# Patient Record
Sex: MALE | NOT HISPANIC OR LATINO | URBAN - METROPOLITAN AREA
[De-identification: names, ages, dates, MRNs, and addresses within clinical notes are randomized per-mention and may not be internally consistent; named-entity substitution may affect disease eponyms.]

---

## 2023-02-02 ENCOUNTER — OFFICE VISIT (OUTPATIENT)
Dept: PRIMARY CARE CLINIC | Facility: CLINIC | Age: 48
End: 2023-02-02
Payer: COMMERCIAL

## 2023-02-02 VITALS
OXYGEN SATURATION: 100 % | RESPIRATION RATE: 18 BRPM | HEIGHT: 72 IN | WEIGHT: 288 LBS | HEART RATE: 67 BPM | BODY MASS INDEX: 39.01 KG/M2 | SYSTOLIC BLOOD PRESSURE: 144 MMHG | DIASTOLIC BLOOD PRESSURE: 88 MMHG

## 2023-02-02 DIAGNOSIS — Z00.00 ANNUAL PHYSICAL EXAM: Primary | ICD-10-CM

## 2023-02-02 DIAGNOSIS — Z12.5 PROSTATE CANCER SCREENING: ICD-10-CM

## 2023-02-02 DIAGNOSIS — R35.0 URINE FREQUENCY: ICD-10-CM

## 2023-02-02 DIAGNOSIS — Z13.1 DIABETES MELLITUS SCREENING: ICD-10-CM

## 2023-02-02 DIAGNOSIS — Z13.220 SCREENING CHOLESTEROL LEVEL: ICD-10-CM

## 2023-02-02 DIAGNOSIS — Z13.29 THYROID DISORDER SCREEN: ICD-10-CM

## 2023-02-02 DIAGNOSIS — E55.9 VITAMIN D DEFICIENCY: ICD-10-CM

## 2023-02-02 PROCEDURE — 99204 PR OFFICE/OUTPT VISIT, NEW, LEVL IV, 45-59 MIN: ICD-10-PCS | Mod: S$GLB,,, | Performed by: NURSE PRACTITIONER

## 2023-02-02 PROCEDURE — 3077F SYST BP >= 140 MM HG: CPT | Mod: CPTII,S$GLB,, | Performed by: NURSE PRACTITIONER

## 2023-02-02 PROCEDURE — 1160F RVW MEDS BY RX/DR IN RCRD: CPT | Mod: CPTII,S$GLB,, | Performed by: NURSE PRACTITIONER

## 2023-02-02 PROCEDURE — 1159F PR MEDICATION LIST DOCUMENTED IN MEDICAL RECORD: ICD-10-PCS | Mod: CPTII,S$GLB,, | Performed by: NURSE PRACTITIONER

## 2023-02-02 PROCEDURE — 1159F MED LIST DOCD IN RCRD: CPT | Mod: CPTII,S$GLB,, | Performed by: NURSE PRACTITIONER

## 2023-02-02 PROCEDURE — 3077F PR MOST RECENT SYSTOLIC BLOOD PRESSURE >= 140 MM HG: ICD-10-PCS | Mod: CPTII,S$GLB,, | Performed by: NURSE PRACTITIONER

## 2023-02-02 PROCEDURE — 3008F PR BODY MASS INDEX (BMI) DOCUMENTED: ICD-10-PCS | Mod: CPTII,S$GLB,, | Performed by: NURSE PRACTITIONER

## 2023-02-02 PROCEDURE — 3008F BODY MASS INDEX DOCD: CPT | Mod: CPTII,S$GLB,, | Performed by: NURSE PRACTITIONER

## 2023-02-02 PROCEDURE — 3079F DIAST BP 80-89 MM HG: CPT | Mod: CPTII,S$GLB,, | Performed by: NURSE PRACTITIONER

## 2023-02-02 PROCEDURE — 1160F PR REVIEW ALL MEDS BY PRESCRIBER/CLIN PHARMACIST DOCUMENTED: ICD-10-PCS | Mod: CPTII,S$GLB,, | Performed by: NURSE PRACTITIONER

## 2023-02-02 PROCEDURE — 99204 OFFICE O/P NEW MOD 45 MIN: CPT | Mod: S$GLB,,, | Performed by: NURSE PRACTITIONER

## 2023-02-02 PROCEDURE — 3079F PR MOST RECENT DIASTOLIC BLOOD PRESSURE 80-89 MM HG: ICD-10-PCS | Mod: CPTII,S$GLB,, | Performed by: NURSE PRACTITIONER

## 2023-02-02 NOTE — PATIENT INSTRUCTIONS
RTC in1 month for F/U or sooner if needed.      Patient Education       Yearly Physical for Adults   About this topic   Most people do not want to be sick. Having a checkup each year with your doctor is one way to help you stay healthy. You may need to see your doctor more or less often. How often you need to go to the doctor depends on your age. Your family and medical history also play a role in how often you need to go to the doctor. Going to see your doctor on a routine basis can help you find problems early or even before they start. This may make it easier to treat or cure your problem.  General   Your doctor will talk about many things during your checkup. Your doctor may ask about:  Your medical and family history.  All the drugs you are taking. Be sure to include all prescription, over the counter, and herbal supplements. Tell the doctor if you have any drug allergy. Bring a list of drugs you take with you.  How you are feeling and if you are having any problems.  Risky behaviors like smoking, drinking alcohol, using illegal drugs, not wearing seatbelts, having unprotected sex, etc.  Your doctor will do a physical exam and may check your:  Height and weight  Blood pressure  Reflexes  Memory  Vision  Hearing  Your doctor may order:  Lab tests  ECG to check your heart rhythm  X-rays  Tests or treatments based on your exam  What lifestyle changes are needed?   Your doctor may suggest you make changes to your lifestyle at this visit. The doctor may talk with you about being more active or lowering stress levels. Ask your doctor what you need to do.  What drugs may be needed?   Your doctor may order drugs or vaccines to protect you from illnesses.  What changes to diet are needed?   Talk to your doctor to see if any changes are needed to your diet.  When do I need to call the doctor?   Call your doctor if you need to learn about any test results. Together you can make a plan for more care.  Helpful tips   Make a  list of questions for your doctor before you go. This will help you remember to ask about any concerns. Write down any answers from your doctor so you can look over them after your visit.   Tell your doctor about any changes in your body or health since your last visit.  Ask your doctor about any screening tests you need.  Where can I learn more?   American Academy of Family Physicians  http://familydoctor.org/familydoctor/en/prevention-wellness/staying-healthy/healthy-living/preventive-services-for-healthy-living.printerview.html   Centers for Disease Control  http://www.cdc.gov/family/checkup/   Last Reviewed Date   2019-04-22  Consumer Information Use and Disclaimer   This information is not specific medical advice and does not replace information you receive from your health care provider. This is only a brief summary of general information. It does NOT include all information about conditions, illnesses, injuries, tests, procedures, treatments, therapies, discharge instructions or life-style choices that may apply to you. You must talk with your health care provider for complete information about your health and treatment options. This information should not be used to decide whether or not to accept your health care providers advice, instructions or recommendations. Only your health care provider has the knowledge and training to provide advice that is right for you.  Copyright   Copyright © 2021 [employer] and its affiliates and/or licensors. All rights reserved.

## 2023-02-02 NOTE — PROGRESS NOTES
Subjective:       Patient ID: Ronaldo Michele is a 48 y.o. male.    Chief Complaint: Establish Care (Pt just recently moved to area from [place]. He was previously seeing Dr. Alfonso Del Rosario in [place].)    HPI: Ronaldo is a 48 y.o. male who presents to establish care with a new PCP. Previously seen by a provider in [place] which is where he is from.     Only history is Obstructive Sleep Apnea and he sleeps with a CPAP at night but admits he does not use it every night.    Denies smoking or drinking.    Had a Colonoscopy in 2017 by a Provider in [place] and it was negative so will repeat in 10 years.    Reports family history of Diabetes and HTN on maternal side. Paternal Grandfather with Prostate Cancer.    UTD with COVID. Refuses FLU shot.            Past Medical History:   Diagnosis Date    Sleep apnea        Past Surgical History:   Procedure Laterality Date    CHOLESTEATOMA EXCISION  02/07/2020    FOOT SURGERY Right 2020    Triple right foor surgery       Family History   Problem Relation Age of Onset    Hypertension Mother     Kidney disease Maternal Grandmother        Social History     Tobacco Use    Smoking status: Former     Types: Cigarettes    Smokeless tobacco: Never   Substance Use Topics    Alcohol use: Not Currently    Drug use: Never       There is no problem list on file for this patient.      Immunization History   Administered Date(s) Administered    COVID-19, MRNA, LN-S, PF (MODERNA FULL 0.5 ML DOSE) 03/06/2021, 04/01/2021, 01/12/2022           Review of Systems   Constitutional:  Positive for fatigue. Negative for activity change, appetite change, chills, diaphoresis and fever.   HENT:  Negative for congestion, ear pain, tinnitus and trouble swallowing.    Eyes:  Negative for visual disturbance.   Respiratory:  Negative for cough, chest tightness, shortness of breath and wheezing.    Cardiovascular:  Negative for chest pain, palpitations and leg swelling.   Gastrointestinal:  Negative for abdominal  distention, abdominal pain, blood in stool, constipation, diarrhea, nausea and vomiting.   Endocrine: Negative for cold intolerance, heat intolerance, polydipsia, polyphagia and polyuria.   Genitourinary:  Negative for difficulty urinating, dysuria, frequency, hematuria and urgency.   Musculoskeletal:  Negative for back pain, gait problem and neck pain.   Skin:  Negative for color change and rash.   Neurological:  Negative for dizziness, speech difficulty, weakness, light-headedness, numbness and headaches.   Hematological:  Negative for adenopathy. Does not bruise/bleed easily.   Psychiatric/Behavioral:  Negative for behavioral problems, confusion and dysphoric mood. The patient is not nervous/anxious.    Objective:     Vitals:    02/02/23 1500   BP: (!) 144/88   BP Location: Right arm   Patient Position: Sitting   BP Method: Large (Automatic)   Pulse: 67   Resp: 18   SpO2: 100%   Weight: 130.6 kg (288 lb)   Height: 6' (1.829 m)       Physical Exam  Vitals and nursing note reviewed.   Constitutional:       General: He is not in acute distress.     Appearance: Normal appearance. He is obese. He is not diaphoretic.   HENT:      Head: Normocephalic and atraumatic.      Right Ear: Tympanic membrane normal.      Left Ear: Tympanic membrane normal.      Mouth/Throat:      Mouth: Mucous membranes are moist.      Pharynx: Oropharynx is clear.   Eyes:      Conjunctiva/sclera: Conjunctivae normal.      Pupils: Pupils are equal, round, and reactive to light.   Neck:      Thyroid: No thyromegaly or thyroid tenderness.   Cardiovascular:      Rate and Rhythm: Normal rate and regular rhythm.      Pulses: Normal pulses.      Heart sounds: Normal heart sounds.   Pulmonary:      Effort: Pulmonary effort is normal.      Breath sounds: Normal breath sounds. No stridor. No wheezing or rales.   Abdominal:      General: Bowel sounds are normal. There is no distension.      Palpations: Abdomen is soft.      Tenderness: There is no  abdominal tenderness.   Musculoskeletal:         General: No tenderness. Normal range of motion.      Cervical back: Normal range of motion and neck supple.      Right lower leg: No edema.      Left lower leg: No edema.   Lymphadenopathy:      Cervical: No cervical adenopathy.   Skin:     General: Skin is warm and dry.      Capillary Refill: Capillary refill takes less than 2 seconds.      Findings: No rash.   Neurological:      General: No focal deficit present.      Mental Status: He is alert and oriented to person, place, and time.   Psychiatric:         Mood and Affect: Mood and affect normal.         Behavior: Behavior normal. Behavior is cooperative.         Thought Content: Thought content normal.         Judgment: Judgment normal.       No results found for any previous visit.         Assessment:      1. Annual physical exam    2. Prostate cancer screening    3. Screening cholesterol level    4. Thyroid disorder screen    5. Urine frequency    6. Vitamin D deficiency    7. Diabetes mellitus screening          Plan:     Annual physical exam  Comments:  Will review labs and determine POC based on results  Orders:  -     Comprehensive Metabolic Panel; Future; Expected date: 02/02/2023  -     CBC auto differential; Future; Expected date: 02/02/2023  -     TSH; Future; Expected date: 02/02/2023  -     Lipid Panel; Future; Expected date: 02/02/2023  -     PSA, Screening; Future; Expected date: 02/08/2023  -     Hemoglobin A1C; Future; Expected date: 02/08/2023    Prostate cancer screening  -     PSA, Screening; Future; Expected date: 02/08/2023    Screening cholesterol level    Thyroid disorder screen    Urine frequency  -     Urinalysis; Future; Expected date: 02/08/2023    Vitamin D deficiency  -     Vitamin D; Future; Expected date: 02/08/2023    Diabetes mellitus screening  -     Hemoglobin A1C; Future; Expected date: 02/08/2023         No current outpatient medications on file.     No current  facility-administered medications for this visit.       There are no discontinued medications.    Health Maintenance   Topic Date Due    Hepatitis C Screening  Never done    TETANUS VACCINE  Never done    Lipid Panel  03/24/2025       Patient Instructions   RTC in1 month for F/U or sooner if needed.      Patient Education       Yearly Physical for Adults   About this topic   Most people do not want to be sick. Having a checkup each year with your doctor is one way to help you stay healthy. You may need to see your doctor more or less often. How often you need to go to the doctor depends on your age. Your family and medical history also play a role in how often you need to go to the doctor. Going to see your doctor on a routine basis can help you find problems early or even before they start. This may make it easier to treat or cure your problem.  General   Your doctor will talk about many things during your checkup. Your doctor may ask about:  Your medical and family history.  All the drugs you are taking. Be sure to include all prescription, over the counter, and herbal supplements. Tell the doctor if you have any drug allergy. Bring a list of drugs you take with you.  How you are feeling and if you are having any problems.  Risky behaviors like smoking, drinking alcohol, using illegal drugs, not wearing seatbelts, having unprotected sex, etc.  Your doctor will do a physical exam and may check your:  Height and weight  Blood pressure  Reflexes  Memory  Vision  Hearing  Your doctor may order:  Lab tests  ECG to check your heart rhythm  X-rays  Tests or treatments based on your exam  What lifestyle changes are needed?   Your doctor may suggest you make changes to your lifestyle at this visit. The doctor may talk with you about being more active or lowering stress levels. Ask your doctor what you need to do.  What drugs may be needed?   Your doctor may order drugs or vaccines to protect you from illnesses.  What  changes to diet are needed?   Talk to your doctor to see if any changes are needed to your diet.  When do I need to call the doctor?   Call your doctor if you need to learn about any test results. Together you can make a plan for more care.  Helpful tips   Make a list of questions for your doctor before you go. This will help you remember to ask about any concerns. Write down any answers from your doctor so you can look over them after your visit.   Tell your doctor about any changes in your body or health since your last visit.  Ask your doctor about any screening tests you need.  Where can I learn more?   American Academy of Family Physicians  http://familydoctor.org/familydoctor/en/prevention-wellness/staying-healthy/healthy-living/preventive-services-for-healthy-living.printerview.html   Centers for Disease Control  http://www.cdc.gov/family/checkup/   Last Reviewed Date   2019-04-22  Consumer Information Use and Disclaimer   This information is not specific medical advice and does not replace information you receive from your health care provider. This is only a brief summary of general information. It does NOT include all information about conditions, illnesses, injuries, tests, procedures, treatments, therapies, discharge instructions or life-style choices that may apply to you. You must talk with your health care provider for complete information about your health and treatment options. This information should not be used to decide whether or not to accept your health care providers advice, instructions or recommendations. Only your health care provider has the knowledge and training to provide advice that is right for you.  Copyright   Copyright © 2021 [employer] and its affiliates and/or licensors. All rights reserved.      Risks, benefits, and alternatives discussed with patient, Patient verbalized understanding of discussed plan of care. Asked patient if any further questions, answered no.    Future  Appointments   Date Time Provider Department Center   4/6/2023  1:00 PM [facility]

## 2023-02-03 ENCOUNTER — PATIENT MESSAGE (OUTPATIENT)
Dept: ADMINISTRATIVE | Facility: HOSPITAL | Age: 48
End: 2023-02-03
Payer: COMMERCIAL

## 2023-02-17 LAB
25(OH)D3 SERPL-MCNC: 16 NG/ML (ref 30–100)
ALBUMIN SERPL-MCNC: 4.1 G/DL (ref 3.6–5.1)
ALBUMIN/GLOB SERPL: 1.3 (CALC) (ref 1–2.5)
ALP SERPL-CCNC: 46 U/L (ref 36–130)
ALT SERPL-CCNC: 35 U/L (ref 9–46)
AST SERPL-CCNC: 25 U/L (ref 10–40)
BASOPHILS # BLD AUTO: 49 CELLS/UL (ref 0–200)
BASOPHILS NFR BLD AUTO: 1.4 %
BILIRUB SERPL-MCNC: 1.1 MG/DL (ref 0.2–1.2)
BUN SERPL-MCNC: 12 MG/DL (ref 7–25)
BUN/CREAT SERPL: ABNORMAL (CALC) (ref 6–22)
CALCIUM SERPL-MCNC: 9.1 MG/DL (ref 8.6–10.3)
CHLORIDE SERPL-SCNC: 104 MMOL/L (ref 98–110)
CHOLEST SERPL-MCNC: 186 MG/DL
CHOLEST/HDLC SERPL: 5.2 (CALC)
CO2 SERPL-SCNC: 27 MMOL/L (ref 20–32)
CREAT SERPL-MCNC: 0.99 MG/DL (ref 0.6–1.29)
EGFR: 95 ML/MIN/1.73M2
EOSINOPHIL # BLD AUTO: 123 CELLS/UL (ref 15–500)
EOSINOPHIL NFR BLD AUTO: 3.5 %
ERYTHROCYTE [DISTWIDTH] IN BLOOD BY AUTOMATED COUNT: 13.8 % (ref 11–15)
GLOBULIN SER CALC-MCNC: 3.1 G/DL (CALC) (ref 1.9–3.7)
GLUCOSE SERPL-MCNC: 105 MG/DL (ref 65–99)
HBA1C MFR BLD: 5.6 % OF TOTAL HGB
HCT VFR BLD AUTO: 38.5 % (ref 38.5–50)
HDLC SERPL-MCNC: 36 MG/DL
HGB BLD-MCNC: 13.1 G/DL (ref 13.2–17.1)
LDLC SERPL CALC-MCNC: 104 MG/DL (CALC)
LYMPHOCYTES # BLD AUTO: 1600 CELLS/UL (ref 850–3900)
LYMPHOCYTES NFR BLD AUTO: 45.7 %
MCH RBC QN AUTO: 29.5 PG (ref 27–33)
MCHC RBC AUTO-ENTMCNC: 34 G/DL (ref 32–36)
MCV RBC AUTO: 86.7 FL (ref 80–100)
MONOCYTES # BLD AUTO: 364 CELLS/UL (ref 200–950)
MONOCYTES NFR BLD AUTO: 10.4 %
NEUTROPHILS # BLD AUTO: 1365 CELLS/UL (ref 1500–7800)
NEUTROPHILS NFR BLD AUTO: 39 %
NONHDLC SERPL-MCNC: 150 MG/DL (CALC)
PLATELET # BLD AUTO: 279 THOUSAND/UL (ref 140–400)
PMV BLD REES-ECKER: 9.9 FL (ref 7.5–12.5)
POTASSIUM SERPL-SCNC: 4.4 MMOL/L (ref 3.5–5.3)
PROT SERPL-MCNC: 7.2 G/DL (ref 6.1–8.1)
PSA SERPL-MCNC: 0.96 NG/ML
RBC # BLD AUTO: 4.44 MILLION/UL (ref 4.2–5.8)
SODIUM SERPL-SCNC: 137 MMOL/L (ref 135–146)
TRIGL SERPL-MCNC: 348 MG/DL
TSH SERPL-ACNC: 2.34 MIU/L (ref 0.4–4.5)
WBC # BLD AUTO: 3.5 THOUSAND/UL (ref 3.8–10.8)

## 2023-02-24 DIAGNOSIS — E55.9 VITAMIN D DEFICIENCY: Primary | ICD-10-CM

## 2023-02-24 RX ORDER — ASPIRIN 325 MG
50000 TABLET, DELAYED RELEASE (ENTERIC COATED) ORAL WEEKLY
Qty: 12 CAPSULE | Refills: 0 | Status: SHIPPED | OUTPATIENT
Start: 2023-02-24 | End: 2023-07-10 | Stop reason: ALTCHOICE

## 2023-02-24 NOTE — PROGRESS NOTES
Please notify patient his vitamin D level is low so I have ordered Vitamin D capsules to his pharmacy to take once per week. Also, his Cholesterol is elevated, I recommend eating a healthy diet with more veggies, lean proteins, fruit, whole grains, less fried foods, less fatty foods, less fast foods. Drink more water and exercise at least 30 min. Per day 3-4 days per week. Brisk walking would be a great start. We will continue to monitor this. All other labs are normal. We will discuss all of his lab results at his next visit.

## 2023-02-27 ENCOUNTER — TELEPHONE (OUTPATIENT)
Dept: PRIMARY CARE CLINIC | Facility: CLINIC | Age: 48
End: 2023-02-27
Payer: COMMERCIAL

## 2023-02-27 NOTE — TELEPHONE ENCOUNTER
----- Message from Maryellen Samuel NP sent at 2/24/2023  2:05 PM CST -----  Please notify patient his vitamin D level is low so I have ordered Vitamin D capsules to his pharmacy to take once per week. Also, his Cholesterol is elevated, I recommend eating a healthy diet with more veggies, lean proteins, fruit, whole grains, less fried foods, less fatty foods, less fast foods. Drink more water and exercise at least 30 min. Per day 3-4 days per week. Brisk walking would be a great start. We will continue to monitor this. All other labs are normal. We will discuss all of his lab results at his next visit.

## 2023-04-06 ENCOUNTER — OFFICE VISIT (OUTPATIENT)
Dept: PRIMARY CARE CLINIC | Facility: CLINIC | Age: 48
End: 2023-04-06
Payer: COMMERCIAL

## 2023-04-06 VITALS
HEART RATE: 60 BPM | OXYGEN SATURATION: 98 % | HEIGHT: 72 IN | BODY MASS INDEX: 37.54 KG/M2 | DIASTOLIC BLOOD PRESSURE: 84 MMHG | WEIGHT: 277.19 LBS | SYSTOLIC BLOOD PRESSURE: 135 MMHG

## 2023-04-06 DIAGNOSIS — E55.9 VITAMIN D DEFICIENCY: ICD-10-CM

## 2023-04-06 DIAGNOSIS — M54.41 ACUTE MIDLINE LOW BACK PAIN WITH RIGHT-SIDED SCIATICA: ICD-10-CM

## 2023-04-06 DIAGNOSIS — M62.830 SPASM OF BACK MUSCLES: ICD-10-CM

## 2023-04-06 DIAGNOSIS — E78.2 MIXED HYPERLIPIDEMIA: Primary | ICD-10-CM

## 2023-04-06 DIAGNOSIS — R79.9 ABNORMAL BLOOD CELL COUNT: ICD-10-CM

## 2023-04-06 PROCEDURE — 1160F RVW MEDS BY RX/DR IN RCRD: CPT | Mod: CPTII,S$GLB,, | Performed by: NURSE PRACTITIONER

## 2023-04-06 PROCEDURE — 3044F HG A1C LEVEL LT 7.0%: CPT | Mod: CPTII,S$GLB,, | Performed by: NURSE PRACTITIONER

## 2023-04-06 PROCEDURE — 3075F PR MOST RECENT SYSTOLIC BLOOD PRESS GE 130-139MM HG: ICD-10-PCS | Mod: CPTII,S$GLB,, | Performed by: NURSE PRACTITIONER

## 2023-04-06 PROCEDURE — 99214 PR OFFICE/OUTPT VISIT, EST, LEVL IV, 30-39 MIN: ICD-10-PCS | Mod: S$GLB,,, | Performed by: NURSE PRACTITIONER

## 2023-04-06 PROCEDURE — 1159F MED LIST DOCD IN RCRD: CPT | Mod: CPTII,S$GLB,, | Performed by: NURSE PRACTITIONER

## 2023-04-06 PROCEDURE — 3079F PR MOST RECENT DIASTOLIC BLOOD PRESSURE 80-89 MM HG: ICD-10-PCS | Mod: CPTII,S$GLB,, | Performed by: NURSE PRACTITIONER

## 2023-04-06 PROCEDURE — 3008F PR BODY MASS INDEX (BMI) DOCUMENTED: ICD-10-PCS | Mod: CPTII,S$GLB,, | Performed by: NURSE PRACTITIONER

## 2023-04-06 PROCEDURE — 99214 OFFICE O/P EST MOD 30 MIN: CPT | Mod: S$GLB,,, | Performed by: NURSE PRACTITIONER

## 2023-04-06 PROCEDURE — 1159F PR MEDICATION LIST DOCUMENTED IN MEDICAL RECORD: ICD-10-PCS | Mod: CPTII,S$GLB,, | Performed by: NURSE PRACTITIONER

## 2023-04-06 PROCEDURE — 3075F SYST BP GE 130 - 139MM HG: CPT | Mod: CPTII,S$GLB,, | Performed by: NURSE PRACTITIONER

## 2023-04-06 PROCEDURE — 3008F BODY MASS INDEX DOCD: CPT | Mod: CPTII,S$GLB,, | Performed by: NURSE PRACTITIONER

## 2023-04-06 PROCEDURE — 3079F DIAST BP 80-89 MM HG: CPT | Mod: CPTII,S$GLB,, | Performed by: NURSE PRACTITIONER

## 2023-04-06 PROCEDURE — 3044F PR MOST RECENT HEMOGLOBIN A1C LEVEL <7.0%: ICD-10-PCS | Mod: CPTII,S$GLB,, | Performed by: NURSE PRACTITIONER

## 2023-04-06 PROCEDURE — 1160F PR REVIEW ALL MEDS BY PRESCRIBER/CLIN PHARMACIST DOCUMENTED: ICD-10-PCS | Mod: CPTII,S$GLB,, | Performed by: NURSE PRACTITIONER

## 2023-04-06 RX ORDER — CYCLOBENZAPRINE HCL 5 MG
5 TABLET ORAL 3 TIMES DAILY PRN
Qty: 30 TABLET | Refills: 0 | Status: SHIPPED | OUTPATIENT
Start: 2023-04-06 | End: 2023-04-16

## 2023-04-06 RX ORDER — MELOXICAM 7.5 MG/1
7.5 TABLET ORAL DAILY
Qty: 30 TABLET | Refills: 0 | Status: SHIPPED | OUTPATIENT
Start: 2023-04-06 | End: 2023-05-10 | Stop reason: ALTCHOICE

## 2023-04-06 NOTE — PATIENT INSTRUCTIONS
"RTC in 2 months for F/U or sooner if needed.    Patient Education       Low Cholesterol, Saturated Fat, and Trans Fat Diet   About this topic   Cholesterol, saturated fat, and trans fat are in many foods. These may raise your blood cholesterol levels. If your cholesterol is too high, this can cause health problems in your heart, liver, kidneys, and even your eyes. The key to lowering your risk of heart problems is to lower your bad fat intake.  Saturated fats and trans fats are the bad fats. These fats clog your arteries and raise your bad cholesterol. Saturated fats and trans fats are solid fats at room temperature. Saturated fats are animal fats. Trans fats are manmade fats. They add flavor to a lot of packaged foods. Staying away from saturated and trans fats will help your heart.  When you do eat foods with fat, make sure they have the good fats. Monounsaturated and polyunsaturated fats are good fats. These fats help raise your good cholesterol and protect your heart.  General   How to Lower Fat and Cholesterol in Your Diet   Read the labels of the foods you buy from the market to find out how much fat is present. Under 5% of total fat on a label means it is "low fat". Over 20% of total fat on a label means it is high fat.  Eat high fiber foods, like soluble fiber. This type of fiber helps lower cholesterol in the body. Choose oatmeal, fruits (like apples), beans, and nuts to get the most soluble fiber.  Eat foods high in omega-3 fatty acids like henrietta seeds, walnuts, salmon, tuna, trout, herring, flaxseed, and soybeans. These foods help keep the heart healthy.  Limit your bad fat and oil intake.  Stay away from butter, stick margarine, shortening, lard, and palm and coconut oil. Pick plant-based spreads instead.  Limit mayonnaise, salad dressings, gravies, and sauces, unless it is made from low-fat ingredients.  Limit chocolate.  Do not eat high-fat processed foods like hot dogs, perez, sausage, ham and other " luncheon meats high in fat, and some frozen foods. Pick fish, chicken, turkey, and lean meats instead.  Eat more dried beans, lentils, and tofu to get your protein.  Do not eat organ meats, like liver.  Choose nonfat or low-fat milk, yogurt, and cheese.  Use light or fat-free cream cheese and sour cream.  Eat lots of fruits and vegetables.  Pick whole grain breads, cereals, pastas, and rice.  Do not eat snacks that are high in fats like granola, cookies, pies, pastries, doughnuts, and croissants.  Stay away from deep fried foods.  Help When Cooking   Remove the fat portion of meats and the skin from poultry before cooking.  Bake, broil, grill, poach, or roast poultry, fish, and lean meats.  Drain and throw away the fat that drains out of meat as you cook it.  Try to add little or no fat to foods.  Use olive or canola oil for cooking or baking.  Steam your vegetables.  Use herbs or no-oil marinades to flavor foods.         Who should use this diet?   This diet is for people who are at high risk of getting health problems like heart disease, high blood pressure, diabetes, and others. This diet is also good for all people to follow to keep your heart healthy.  What foods are good to eat?   Foods with good fats are:  Canola, peanut, and olive oil  Safflower, soybean, and corn oil  Walnuts, almonds, cashews, and peanuts  Pumpkin and sunflower seeds  [place] and tuna  Tofu  Soymilk  Avocado  What foods should be limited or avoided?   Stay away from these types of foods that have saturated fats:  Whole fat dairy products like cheese, ice cream, whole milk, and cream  Palm and coconut oils  High-fat meats like beef, lamb, poultry with the skin, perez, and sausage  Butter and lard  Stay away from these types of foods that may have trans fat:  Cookies, cakes, candy, doughnuts, baked goods, muffins, pizza dough, and pie crusts that are packaged  Fried foods  Frozen dinners  Chips and crackers  Microwave popcorn  Stick margarine  and vegetable shortenings  Helpful tips   To help stay away from saturated fat:  Pick lean cuts of meat  Take the skin off chicken and turkey or pick skinless  Pick low-fat cheese, milk, and ice cream  Use liquid oils when cooking and baking, such as olive oil and canola oil  To help stay away from trans fat:  Look at your labels. Choose foods with 0% trans fat. Read the ingredient list. Avoid foods with partially hydrogenated oil in the ingredient list. This means there is trans fat in the product.  Where can I learn more?   American Heart Association   http://www.heart.org/HEARTORG/Conditions/Cholesterol/PreventionTreatmentofHighCholesterol/Know-Your-Fats_[facility]_305628_Article.jsp   Last Reviewed Date   2021-10-05  Consumer Information Use and Disclaimer   This information is not specific medical advice and does not replace information you receive from your health care provider. This is only a brief summary of general information. It does NOT include all information about conditions, illnesses, injuries, tests, procedures, treatments, therapies, discharge instructions or life-style choices that may apply to you. You must talk with your health care provider for complete information about your health and treatment options. This information should not be used to decide whether or not to accept your health care providers advice, instructions or recommendations. Only your health care provider has the knowledge and training to provide advice that is right for you.   Copyright   Copyright © 2021 [employer]. and its affiliates and/or licensors. All rights reserved.

## 2023-04-06 NOTE — PROGRESS NOTES
Subjective:       Patient ID: Ronaldo Michele is a 48 y.o. male.    Chief Complaint: Follow-up and Back Pain (Pt states he has been having lower back pain for a week now.)    HPI: Ronaldo is a 48 y.o. male who presents for F/U and to discuss lab results. Wife present during visit.     C/O low back pain for the past week.     Vitamin D level low so he is taking weekly vitamin d for 12 weeks. Also, his cholesterol is elevated, triglycerides 348, LDL . Admits he eats fried food almost daily.    Patient has a history of Obstructive Sleep Apnea and he sleeps with a CPAP at night but admits he does not use it every night.    Denies smoking or drinking.    Had a Colonoscopy in 2017 by a Provider in [place] and it was negative so will repeat in 10 years.    Reports family history of Diabetes and HTN on maternal side. Paternal Grandfather with Prostate Cancer.    UTD with COVID. Refuses FLU shot.            Past Medical History:   Diagnosis Date    Sleep apnea        Past Surgical History:   Procedure Laterality Date    CHOLESTEATOMA EXCISION  02/07/2020    FOOT SURGERY Right 2020    Triple right foor surgery       Family History   Problem Relation Age of Onset    Hypertension Mother     Kidney disease Maternal Grandmother        Social History     Tobacco Use    Smoking status: Former     Types: Cigarettes    Smokeless tobacco: Never   Substance Use Topics    Alcohol use: Not Currently    Drug use: Never       Patient Active Problem List   Diagnosis    Mixed hyperlipidemia    Vitamin D deficiency       Immunization History   Administered Date(s) Administered    COVID-19, MRNA, LN-S, PF (MODERNA FULL 0.5 ML DOSE) 03/06/2021, 04/01/2021, 01/12/2022           Review of Systems   Constitutional:  Positive for fatigue. Negative for activity change, appetite change, chills, diaphoresis and fever.   HENT:  Negative for tinnitus and trouble swallowing.    Eyes:  Negative for visual disturbance.   Respiratory:  Negative for cough,  chest tightness, shortness of breath and wheezing.    Cardiovascular:  Negative for chest pain, palpitations and leg swelling.   Gastrointestinal:  Negative for abdominal distention and abdominal pain.   Genitourinary:  Negative for decreased urine volume, difficulty urinating, dysuria, frequency, hematuria and urgency.   Musculoskeletal:  Positive for back pain. Negative for gait problem, neck pain and neck stiffness.   Skin:  Negative for color change and rash.   Neurological:  Negative for dizziness, speech difficulty, weakness, light-headedness, numbness and headaches.   Psychiatric/Behavioral:  Negative for behavioral problems, confusion and dysphoric mood. The patient is not nervous/anxious.    Objective:     Vitals:    04/06/23 1311   BP: 135/84   BP Location: Left arm   Patient Position: Sitting   BP Method: Large (Automatic)   Pulse: 60   SpO2: 98%   Weight: 125.7 kg (277 lb 3.2 oz)   Height: 6' (1.829 m)         Physical Exam  Vitals and nursing note reviewed.   Constitutional:       Appearance: Normal appearance. He is obese.   HENT:      Head: Normocephalic and atraumatic.      Mouth/Throat:      Mouth: Mucous membranes are moist.      Pharynx: Oropharynx is clear.   Eyes:      Extraocular Movements: Extraocular movements intact.      Conjunctiva/sclera: Conjunctivae normal.   Cardiovascular:      Rate and Rhythm: Normal rate and regular rhythm.      Pulses: Normal pulses.      Heart sounds: Normal heart sounds.   Pulmonary:      Effort: Pulmonary effort is normal.      Breath sounds: Normal breath sounds. No stridor. No wheezing or rales.   Abdominal:      General: There is no distension.      Tenderness: There is no abdominal tenderness.   Musculoskeletal:         General: Tenderness (low back) present. Normal range of motion.      Cervical back: Normal range of motion and neck supple.      Right lower leg: No edema.      Left lower leg: No edema.   Skin:     General: Skin is warm and dry.       Capillary Refill: Capillary refill takes less than 2 seconds.   Neurological:      Mental Status: He is alert and oriented to person, place, and time.   Psychiatric:         Mood and Affect: Mood and affect normal.         Behavior: Behavior normal. Behavior is cooperative.       Office Visit on 02/02/2023   Component Date Value Ref Range Status    Glucose 02/16/2023 105 (H)  65 - 99 mg/dL Final    BUN 02/16/2023 12  7 - 25 mg/dL Final    Creatinine 02/16/2023 0.99  0.60 - 1.29 mg/dL Final    eGFR 02/16/2023 95  > OR = 60 mL/min/1.73m2 Final    BUN/Creatinine Ratio 02/16/2023 NOT APPLICABLE  6 - 22 (calc) Final    Sodium 02/16/2023 137  135 - 146 mmol/L Final    Potassium 02/16/2023 4.4  3.5 - 5.3 mmol/L Final    Chloride 02/16/2023 104  98 - 110 mmol/L Final    CO2 02/16/2023 27  20 - 32 mmol/L Final    Calcium 02/16/2023 9.1  8.6 - 10.3 mg/dL Final    Total Protein 02/16/2023 7.2  6.1 - 8.1 g/dL Final    Albumin 02/16/2023 4.1  3.6 - 5.1 g/dL Final    Globulin, Total 02/16/2023 3.1  1.9 - 3.7 g/dL (calc) Final    Albumin/Globulin Ratio 02/16/2023 1.3  1.0 - 2.5 (calc) Final    Total Bilirubin 02/16/2023 1.1  0.2 - 1.2 mg/dL Final    Alkaline Phosphatase 02/16/2023 46  36 - 130 U/L Final    AST 02/16/2023 25  10 - 40 U/L Final    ALT 02/16/2023 35  9 - 46 U/L Final    WBC 02/16/2023 3.5 (L)  3.8 - 10.8 Thousand/uL Final    RBC 02/16/2023 4.44  4.20 - 5.80 Million/uL Final    Hemoglobin 02/16/2023 13.1 (L)  13.2 - 17.1 g/dL Final    Hematocrit 02/16/2023 38.5  38.5 - 50.0 % Final    MCV 02/16/2023 86.7  80.0 - 100.0 fL Final    MCH 02/16/2023 29.5  27.0 - 33.0 pg Final    MCHC 02/16/2023 34.0  32.0 - 36.0 g/dL Final    RDW 02/16/2023 13.8  11.0 - 15.0 % Final    Platelets 02/16/2023 279  140 - 400 Thousand/uL Final    MPV 02/16/2023 9.9  7.5 - 12.5 fL Final    Neutrophils, Abs 02/16/2023 1,365 (L)  1,500 - 7,800 cells/uL Final    Lymph # 02/16/2023 1,600  850 - 3,900 cells/uL Final    Mono # 02/16/2023 364  200 -  950 cells/uL Final    Eos # 02/16/2023 123  15 - 500 cells/uL Final    Baso # 02/16/2023 49  0 - 200 cells/uL Final    Neutrophils Relative 02/16/2023 39  % Final    Lymph % 02/16/2023 45.7  % Final    Mono % 02/16/2023 10.4  % Final    Eosinophil % 02/16/2023 3.5  % Final    Basophil % 02/16/2023 1.4  % Final    TSH 02/16/2023 2.34  0.40 - 4.50 mIU/L Final    Cholesterol 02/16/2023 186  <200 mg/dL Final    HDL 02/16/2023 36 (L)  > OR = 40 mg/dL Final    Triglycerides 02/16/2023 348 (H)  <150 mg/dL Final    LDL Cholesterol 02/16/2023 104 (H)  mg/dL (calc) Final    HDL/Cholesterol Ratio 02/16/2023 5.2 (H)  <5.0 (calc) Final    Non HDL Chol. (LDL+VLDL) 02/16/2023 150 (H)  <130 mg/dL (calc) Final    PROSTATE SPECIFIC ANTIGEN, SCR - Q* 02/16/2023 0.96  < OR = 4.00 ng/mL Final    Hemoglobin A1C 02/16/2023 5.6  <5.7 % of total Hgb Final    Vitamin D, 25-OH, Total 02/16/2023 16 (L)  30 - 100 ng/mL Final         Assessment:      1. Mixed hyperlipidemia    2. Vitamin D deficiency    3. Acute midline low back pain with right-sided sciatica    4. Abnormal blood cell count    5. Spasm of back muscles            Plan:     Mixed hyperlipidemia  Comments:  Eat low cholesterol diet and exercise regularly, take fish oil capsules 1000 mg daily OTC  Orders:  -     Lipid Panel; Future; Expected date: 05/06/2023    Vitamin D deficiency  Comments:  Continue Vitamin D weekly, will recheck level in 3 months  Orders:  -     Vitamin D; Future; Expected date: 05/06/2023    Acute midline low back pain with right-sided sciatica  -     meloxicam (MOBIC) 7.5 MG tablet; Take 1 tablet (7.5 mg total) by mouth once daily.  Dispense: 30 tablet; Refill: 0    Abnormal blood cell count  -     CBC Auto Differential; Future; Expected date: 05/06/2023    Spasm of back muscles  -     cyclobenzaprine (FLEXERIL) 5 MG tablet; Take 1 tablet (5 mg total) by mouth 3 (three) times daily as needed for Muscle spasms.  Dispense: 30 tablet; Refill: 0           Current  "Outpatient Medications   Medication Sig Dispense Refill    cholecalciferol, vitamin D3, (DECARA) 1,250 mcg (50,000 unit) capsule Take 1 capsule (50,000 Units total) by mouth once a week. 12 capsule 0    cyclobenzaprine (FLEXERIL) 5 MG tablet Take 1 tablet (5 mg total) by mouth 3 (three) times daily as needed for Muscle spasms. 30 tablet 0    meloxicam (MOBIC) 7.5 MG tablet Take 1 tablet (7.5 mg total) by mouth once daily. 30 tablet 0     No current facility-administered medications for this visit.       There are no discontinued medications.    Health Maintenance   Topic Date Due    Hepatitis C Screening  Never done    TETANUS VACCINE  Never done    Lipid Panel  02/16/2028       Patient Instructions   RTC in 2 months for F/U or sooner if needed.    Patient Education       Low Cholesterol, Saturated Fat, and Trans Fat Diet   About this topic   Cholesterol, saturated fat, and trans fat are in many foods. These may raise your blood cholesterol levels. If your cholesterol is too high, this can cause health problems in your heart, liver, kidneys, and even your eyes. The key to lowering your risk of heart problems is to lower your bad fat intake.  Saturated fats and trans fats are the bad fats. These fats clog your arteries and raise your bad cholesterol. Saturated fats and trans fats are solid fats at room temperature. Saturated fats are animal fats. Trans fats are manmade fats. They add flavor to a lot of packaged foods. Staying away from saturated and trans fats will help your heart.  When you do eat foods with fat, make sure they have the good fats. Monounsaturated and polyunsaturated fats are good fats. These fats help raise your good cholesterol and protect your heart.  General   How to Lower Fat and Cholesterol in Your Diet   Read the labels of the foods you buy from the market to find out how much fat is present. Under 5% of total fat on a label means it is "low fat". Over 20% of total fat on a label means it is " high fat.  Eat high fiber foods, like soluble fiber. This type of fiber helps lower cholesterol in the body. Choose oatmeal, fruits (like apples), beans, and nuts to get the most soluble fiber.  Eat foods high in omega-3 fatty acids like henrietta seeds, walnuts, salmon, tuna, trout, herring, flaxseed, and soybeans. These foods help keep the heart healthy.  Limit your bad fat and oil intake.  Stay away from butter, stick margarine, shortening, lard, and palm and coconut oil. Pick plant-based spreads instead.  Limit mayonnaise, salad dressings, gravies, and sauces, unless it is made from low-fat ingredients.  Limit chocolate.  Do not eat high-fat processed foods like hot dogs, perez, sausage, ham and other luncheon meats high in fat, and some frozen foods. Pick fish, chicken, turkey, and lean meats instead.  Eat more dried beans, lentils, and tofu to get your protein.  Do not eat organ meats, like liver.  Choose nonfat or low-fat milk, yogurt, and cheese.  Use light or fat-free cream cheese and sour cream.  Eat lots of fruits and vegetables.  Pick whole grain breads, cereals, pastas, and rice.  Do not eat snacks that are high in fats like granola, cookies, pies, pastries, doughnuts, and croissants.  Stay away from deep fried foods.  Help When Cooking   Remove the fat portion of meats and the skin from poultry before cooking.  Bake, broil, grill, poach, or roast poultry, fish, and lean meats.  Drain and throw away the fat that drains out of meat as you cook it.  Try to add little or no fat to foods.  Use olive or canola oil for cooking or baking.  Steam your vegetables.  Use herbs or no-oil marinades to flavor foods.         Who should use this diet?   This diet is for people who are at high risk of getting health problems like heart disease, high blood pressure, diabetes, and others. This diet is also good for all people to follow to keep your heart healthy.  What foods are good to eat?   Foods with good fats  are:  Canola, peanut, and olive oil  Safflower, soybean, and corn oil  Walnuts, almonds, cashews, and peanuts  Pumpkin and sunflower seeds  [place] and tuna  Tofu  Soymilk  Avocado  What foods should be limited or avoided?   Stay away from these types of foods that have saturated fats:  Whole fat dairy products like cheese, ice cream, whole milk, and cream  Palm and coconut oils  High-fat meats like beef, lamb, poultry with the skin, perez, and sausage  Butter and lard  Stay away from these types of foods that may have trans fat:  Cookies, cakes, candy, doughnuts, baked goods, muffins, pizza dough, and pie crusts that are packaged  Fried foods  Frozen dinners  Chips and crackers  Microwave popcorn  Stick margarine and vegetable shortenings  Helpful tips   To help stay away from saturated fat:  Pick lean cuts of meat  Take the skin off chicken and turkey or pick skinless  Pick low-fat cheese, milk, and ice cream  Use liquid oils when cooking and baking, such as olive oil and canola oil  To help stay away from trans fat:  Look at your labels. Choose foods with 0% trans fat. Read the ingredient list. Avoid foods with partially hydrogenated oil in the ingredient list. This means there is trans fat in the product.  Where can I learn more?   American Heart Association   http://www.heart.org/HEARTORG/Conditions/Cholesterol/PreventionTreatmentofHighCholesterol/Know-Your-Fats_[facility]_305628_Article.jsp   Last Reviewed Date   2021-10-05  Consumer Information Use and Disclaimer   This information is not specific medical advice and does not replace information you receive from your health care provider. This is only a brief summary of general information. It does NOT include all information about conditions, illnesses, injuries, tests, procedures, treatments, therapies, discharge instructions or life-style choices that may apply to you. You must talk with your health care provider for complete information about your health and  treatment options. This information should not be used to decide whether or not to accept your health care providers advice, instructions or recommendations. Only your health care provider has the knowledge and training to provide advice that is right for you.   Copyright   Copyright © 2021 UpToDate, Inc. and its affiliates and/or licensors. All rights reserved.      Risks, benefits, and alternatives discussed with patient, Patient verbalized understanding of discussed plan of care. Asked patient if any further questions, answered no.    Future Appointments   Date Time Provider Department Center   7/10/2023  4:00 PM [facility] NP

## 2023-04-07 PROBLEM — E55.9 VITAMIN D DEFICIENCY: Status: ACTIVE | Noted: 2023-04-07

## 2023-04-07 PROBLEM — E78.2 MIXED HYPERLIPIDEMIA: Status: ACTIVE | Noted: 2023-04-07

## 2023-05-09 ENCOUNTER — PATIENT MESSAGE (OUTPATIENT)
Dept: PRIMARY CARE CLINIC | Facility: CLINIC | Age: 48
End: 2023-05-09
Payer: COMMERCIAL

## 2023-05-09 DIAGNOSIS — M54.41 ACUTE MIDLINE LOW BACK PAIN WITH RIGHT-SIDED SCIATICA: Primary | ICD-10-CM

## 2023-05-09 DIAGNOSIS — M62.830 SPASM OF BACK MUSCLES: ICD-10-CM

## 2023-05-10 RX ORDER — CYCLOBENZAPRINE HCL 5 MG
5 TABLET ORAL 3 TIMES DAILY PRN
Qty: 30 TABLET | Refills: 0 | Status: SHIPPED | OUTPATIENT
Start: 2023-05-10 | End: 2023-05-20

## 2023-05-10 RX ORDER — TRAMADOL HYDROCHLORIDE 50 MG/1
50 TABLET ORAL EVERY 6 HOURS PRN
Qty: 20 TABLET | Refills: 0 | Status: SHIPPED | OUTPATIENT
Start: 2023-05-10 | End: 2023-07-10

## 2023-05-10 NOTE — TELEPHONE ENCOUNTER
Please let patient know I have ordered a low back XRAY to be done to further evaluate his back pain. Let him know he can go to [facility] to have it done and once we see the results, we can decide what to do from there. Also, let him know I just called out some Tramadol to help with the back pain and more muscle relaxers. Please fax XRAY order. Thanks.

## 2023-07-10 ENCOUNTER — OFFICE VISIT (OUTPATIENT)
Dept: PRIMARY CARE CLINIC | Facility: CLINIC | Age: 48
End: 2023-07-10
Payer: COMMERCIAL

## 2023-07-10 VITALS
RESPIRATION RATE: 18 BRPM | HEIGHT: 72 IN | WEIGHT: 279 LBS | HEART RATE: 62 BPM | BODY MASS INDEX: 37.79 KG/M2 | DIASTOLIC BLOOD PRESSURE: 81 MMHG | SYSTOLIC BLOOD PRESSURE: 132 MMHG | OXYGEN SATURATION: 99 %

## 2023-07-10 DIAGNOSIS — E66.01 CLASS 2 SEVERE OBESITY DUE TO EXCESS CALORIES WITH SERIOUS COMORBIDITY AND BODY MASS INDEX (BMI) OF 37.0 TO 37.9 IN ADULT: ICD-10-CM

## 2023-07-10 DIAGNOSIS — E55.9 VITAMIN D DEFICIENCY: ICD-10-CM

## 2023-07-10 DIAGNOSIS — E78.2 MIXED HYPERLIPIDEMIA: Primary | ICD-10-CM

## 2023-07-10 PROCEDURE — 1160F RVW MEDS BY RX/DR IN RCRD: CPT | Mod: CPTII,S$GLB,, | Performed by: NURSE PRACTITIONER

## 2023-07-10 PROCEDURE — 1160F PR REVIEW ALL MEDS BY PRESCRIBER/CLIN PHARMACIST DOCUMENTED: ICD-10-PCS | Mod: CPTII,S$GLB,, | Performed by: NURSE PRACTITIONER

## 2023-07-10 PROCEDURE — 99214 OFFICE O/P EST MOD 30 MIN: CPT | Mod: S$GLB,,, | Performed by: NURSE PRACTITIONER

## 2023-07-10 PROCEDURE — 3008F BODY MASS INDEX DOCD: CPT | Mod: CPTII,S$GLB,, | Performed by: NURSE PRACTITIONER

## 2023-07-10 PROCEDURE — 3044F PR MOST RECENT HEMOGLOBIN A1C LEVEL <7.0%: ICD-10-PCS | Mod: CPTII,S$GLB,, | Performed by: NURSE PRACTITIONER

## 2023-07-10 PROCEDURE — 3075F SYST BP GE 130 - 139MM HG: CPT | Mod: CPTII,S$GLB,, | Performed by: NURSE PRACTITIONER

## 2023-07-10 PROCEDURE — 3008F PR BODY MASS INDEX (BMI) DOCUMENTED: ICD-10-PCS | Mod: CPTII,S$GLB,, | Performed by: NURSE PRACTITIONER

## 2023-07-10 PROCEDURE — 1159F PR MEDICATION LIST DOCUMENTED IN MEDICAL RECORD: ICD-10-PCS | Mod: CPTII,S$GLB,, | Performed by: NURSE PRACTITIONER

## 2023-07-10 PROCEDURE — 1159F MED LIST DOCD IN RCRD: CPT | Mod: CPTII,S$GLB,, | Performed by: NURSE PRACTITIONER

## 2023-07-10 PROCEDURE — 3079F DIAST BP 80-89 MM HG: CPT | Mod: CPTII,S$GLB,, | Performed by: NURSE PRACTITIONER

## 2023-07-10 PROCEDURE — 3044F HG A1C LEVEL LT 7.0%: CPT | Mod: CPTII,S$GLB,, | Performed by: NURSE PRACTITIONER

## 2023-07-10 PROCEDURE — 3079F PR MOST RECENT DIASTOLIC BLOOD PRESSURE 80-89 MM HG: ICD-10-PCS | Mod: CPTII,S$GLB,, | Performed by: NURSE PRACTITIONER

## 2023-07-10 PROCEDURE — 3075F PR MOST RECENT SYSTOLIC BLOOD PRESS GE 130-139MM HG: ICD-10-PCS | Mod: CPTII,S$GLB,, | Performed by: NURSE PRACTITIONER

## 2023-07-10 PROCEDURE — 99214 PR OFFICE/OUTPT VISIT, EST, LEVL IV, 30-39 MIN: ICD-10-PCS | Mod: S$GLB,,, | Performed by: NURSE PRACTITIONER

## 2023-07-10 NOTE — PATIENT INSTRUCTIONS
"RTC in 2 months for F/U or sooner if needed.    Patient Education       Low Cholesterol, Saturated Fat, and Trans Fat Diet   About this topic   Cholesterol, saturated fat, and trans fat are in many foods. These may raise your blood cholesterol levels. If your cholesterol is too high, this can cause health problems in your heart, liver, kidneys, and even your eyes. The key to lowering your risk of heart problems is to lower your bad fat intake.  Saturated fats and trans fats are the bad fats. These fats clog your arteries and raise your bad cholesterol. Saturated fats and trans fats are solid fats at room temperature. Saturated fats are animal fats. Trans fats are manmade fats. They add flavor to a lot of packaged foods. Staying away from saturated and trans fats will help your heart.  When you do eat foods with fat, make sure they have the good fats. Monounsaturated and polyunsaturated fats are good fats. These fats help raise your good cholesterol and protect your heart.  General   How to Lower Fat and Cholesterol in Your Diet   Read the labels of the foods you buy from the market to find out how much fat is present. Under 5% of total fat on a label means it is "low fat". Over 20% of total fat on a label means it is high fat.  Eat high fiber foods, like soluble fiber. This type of fiber helps lower cholesterol in the body. Choose oatmeal, fruits (like apples), beans, and nuts to get the most soluble fiber.  Eat foods high in omega-3 fatty acids like henrietta seeds, walnuts, salmon, tuna, trout, herring, flaxseed, and soybeans. These foods help keep the heart healthy.  Limit your bad fat and oil intake.  Stay away from butter, stick margarine, shortening, lard, and palm and coconut oil. Pick plant-based spreads instead.  Limit mayonnaise, salad dressings, gravies, and sauces, unless it is made from low-fat ingredients.  Limit chocolate.  Do not eat high-fat processed foods like hot dogs, perez, sausage, ham and other " luncheon meats high in fat, and some frozen foods. Pick fish, chicken, turkey, and lean meats instead.  Eat more dried beans, lentils, and tofu to get your protein.  Do not eat organ meats, like liver.  Choose nonfat or low-fat milk, yogurt, and cheese.  Use light or fat-free cream cheese and sour cream.  Eat lots of fruits and vegetables.  Pick whole grain breads, cereals, pastas, and rice.  Do not eat snacks that are high in fats like granola, cookies, pies, pastries, doughnuts, and croissants.  Stay away from deep fried foods.  Help When Cooking   Remove the fat portion of meats and the skin from poultry before cooking.  Bake, broil, grill, poach, or roast poultry, fish, and lean meats.  Drain and throw away the fat that drains out of meat as you cook it.  Try to add little or no fat to foods.  Use olive or canola oil for cooking or baking.  Steam your vegetables.  Use herbs or no-oil marinades to flavor foods.         Who should use this diet?   This diet is for people who are at high risk of getting health problems like heart disease, high blood pressure, diabetes, and others. This diet is also good for all people to follow to keep your heart healthy.  What foods are good to eat?   Foods with good fats are:  Canola, peanut, and olive oil  Safflower, soybean, and corn oil  Walnuts, almonds, cashews, and peanuts  Pumpkin and sunflower seeds  [place] and tuna  Tofu  Soymilk  Avocado  What foods should be limited or avoided?   Stay away from these types of foods that have saturated fats:  Whole fat dairy products like cheese, ice cream, whole milk, and cream  Palm and coconut oils  High-fat meats like beef, lamb, poultry with the skin, perez, and sausage  Butter and lard  Stay away from these types of foods that may have trans fat:  Cookies, cakes, candy, doughnuts, baked goods, muffins, pizza dough, and pie crusts that are packaged  Fried foods  Frozen dinners  Chips and crackers  Microwave popcorn  Stick margarine  and vegetable shortenings  Helpful tips   To help stay away from saturated fat:  Pick lean cuts of meat  Take the skin off chicken and turkey or pick skinless  Pick low-fat cheese, milk, and ice cream  Use liquid oils when cooking and baking, such as olive oil and canola oil  To help stay away from trans fat:  Look at your labels. Choose foods with 0% trans fat. Read the ingredient list. Avoid foods with partially hydrogenated oil in the ingredient list. This means there is trans fat in the product.  Where can I learn more?   American Heart Association   http://www.heart.org/HEARTORG/Conditions/Cholesterol/PreventionTreatmentofHighCholesterol/Know-Your-Fats_[facility]_305628_Article.jsp   Last Reviewed Date   2021-10-05  Consumer Information Use and Disclaimer   This information is not specific medical advice and does not replace information you receive from your health care provider. This is only a brief summary of general information. It does NOT include all information about conditions, illnesses, injuries, tests, procedures, treatments, therapies, discharge instructions or life-style choices that may apply to you. You must talk with your health care provider for complete information about your health and treatment options. This information should not be used to decide whether or not to accept your health care providers advice, instructions or recommendations. Only your health care provider has the knowledge and training to provide advice that is right for you.   Copyright   Copyright © 2021 UpToDate, Inc. and its affiliates and/or licensors. All rights reserved.

## 2023-07-10 NOTE — PROGRESS NOTES
Subjective:       Patient ID: Ronaldo Michele is a 48 y.o. male.    Chief Complaint: Follow-up    HPI: Ronaldo is a 48 y.o. male who presents for F/U. Wife present during visit.     Denies having any more back pain. Mr. Michele does not have any issues or concerns today.     Vitamin D level low was low he has completed taking weekly vitamin D for 12 weeks and feels less fatigued now. Also, his cholesterol was elevated, triglycerides 348, LDL was 104 so he has cut back om eating fried foods    Patient has a history of Obstructive Sleep Apnea so he sleeps with a CPAP at night but admits he does not use it every night.    Denies smoking or drinking.    Had a Colonoscopy in 2017 by a Provider in [place] and it was negative so will repeat in 10 years.    Reports family history of Diabetes and HTN on maternal side. Paternal Grandfather with Prostate Cancer.    UTD with COVID. Refuses FLU shot.            Past Medical History:   Diagnosis Date    Sleep apnea        Past Surgical History:   Procedure Laterality Date    CHOLESTEATOMA EXCISION  02/07/2020    FOOT SURGERY Right 2020    Triple right foor surgery       Family History   Problem Relation Age of Onset    Hypertension Mother     Kidney disease Maternal Grandmother        Social History     Tobacco Use    Smoking status: Former     Types: Cigarettes    Smokeless tobacco: Never   Substance Use Topics    Alcohol use: Not Currently    Drug use: Never       Patient Active Problem List   Diagnosis    Mixed hyperlipidemia    Vitamin D deficiency       Immunization History   Administered Date(s) Administered    COVID-19, MRNA, LN-S, PF (MODERNA FULL 0.5 ML DOSE) 03/06/2021, 04/01/2021, 01/12/2022           Review of Systems   Constitutional:  Negative for activity change, appetite change, chills, diaphoresis, fatigue and fever.   HENT:  Negative for tinnitus and trouble swallowing.    Eyes:  Negative for visual disturbance.   Respiratory:  Negative for cough, chest tightness,  shortness of breath and wheezing.    Cardiovascular:  Negative for chest pain, palpitations and leg swelling.   Gastrointestinal:  Negative for abdominal distention, abdominal pain, blood in stool, constipation, diarrhea and nausea.   Genitourinary:  Negative for dysuria, frequency, hematuria and urgency.   Musculoskeletal:  Negative for back pain, gait problem and neck pain.   Skin:  Negative for color change and rash.   Neurological:  Negative for dizziness, speech difficulty, weakness, light-headedness, numbness and headaches.   Psychiatric/Behavioral:  Negative for behavioral problems, confusion and dysphoric mood. The patient is not nervous/anxious.    Objective:     Vitals:    07/10/23 1605   BP: 132/81   BP Location: Right arm   Patient Position: Sitting   BP Method: Medium (Automatic)   Pulse: 62   Resp: 18   SpO2: 99%   Weight: 126.6 kg (279 lb)   Height: 6' (1.829 m)       Physical Exam  Vitals and nursing note reviewed.   Constitutional:       General: He is not in acute distress.     Appearance: Normal appearance. He is not diaphoretic.   HENT:      Head: Normocephalic and atraumatic.      Mouth/Throat:      Mouth: Mucous membranes are moist.      Pharynx: Oropharynx is clear.   Eyes:      Conjunctiva/sclera: Conjunctivae normal.      Pupils: Pupils are equal, round, and reactive to light.   Cardiovascular:      Rate and Rhythm: Normal rate and regular rhythm.   Pulmonary:      Effort: Pulmonary effort is normal.      Breath sounds: Normal breath sounds. No wheezing or rales.   Abdominal:      General: There is no distension.      Tenderness: There is no abdominal tenderness.   Musculoskeletal:         General: No tenderness. Normal range of motion.      Cervical back: Normal range of motion.      Right lower leg: No edema.      Left lower leg: No edema.   Lymphadenopathy:      Cervical: No cervical adenopathy.   Skin:     General: Skin is warm and dry.      Capillary Refill: Capillary refill takes less  than 2 seconds.      Findings: No rash.   Neurological:      Mental Status: He is alert and oriented to person, place, and time.   Psychiatric:         Mood and Affect: Mood and affect normal.         Behavior: Behavior normal. Behavior is cooperative.       Office Visit on 02/02/2023   Component Date Value Ref Range Status    Glucose 02/16/2023 105 (H)  65 - 99 mg/dL Final    BUN 02/16/2023 12  7 - 25 mg/dL Final    Creatinine 02/16/2023 0.99  0.60 - 1.29 mg/dL Final    eGFR 02/16/2023 95  > OR = 60 mL/min/1.73m2 Final    BUN/Creatinine Ratio 02/16/2023 NOT APPLICABLE  6 - 22 (calc) Final    Sodium 02/16/2023 137  135 - 146 mmol/L Final    Potassium 02/16/2023 4.4  3.5 - 5.3 mmol/L Final    Chloride 02/16/2023 104  98 - 110 mmol/L Final    CO2 02/16/2023 27  20 - 32 mmol/L Final    Calcium 02/16/2023 9.1  8.6 - 10.3 mg/dL Final    Total Protein 02/16/2023 7.2  6.1 - 8.1 g/dL Final    Albumin 02/16/2023 4.1  3.6 - 5.1 g/dL Final    Globulin, Total 02/16/2023 3.1  1.9 - 3.7 g/dL (calc) Final    Albumin/Globulin Ratio 02/16/2023 1.3  1.0 - 2.5 (calc) Final    Total Bilirubin 02/16/2023 1.1  0.2 - 1.2 mg/dL Final    Alkaline Phosphatase 02/16/2023 46  36 - 130 U/L Final    AST 02/16/2023 25  10 - 40 U/L Final    ALT 02/16/2023 35  9 - 46 U/L Final    WBC 02/16/2023 3.5 (L)  3.8 - 10.8 Thousand/uL Final    RBC 02/16/2023 4.44  4.20 - 5.80 Million/uL Final    Hemoglobin 02/16/2023 13.1 (L)  13.2 - 17.1 g/dL Final    Hematocrit 02/16/2023 38.5  38.5 - 50.0 % Final    MCV 02/16/2023 86.7  80.0 - 100.0 fL Final    MCH 02/16/2023 29.5  27.0 - 33.0 pg Final    MCHC 02/16/2023 34.0  32.0 - 36.0 g/dL Final    RDW 02/16/2023 13.8  11.0 - 15.0 % Final    Platelets 02/16/2023 279  140 - 400 Thousand/uL Final    MPV 02/16/2023 9.9  7.5 - 12.5 fL Final    Neutrophils, Abs 02/16/2023 1,365 (L)  1,500 - 7,800 cells/uL Final    Lymph # 02/16/2023 1,600  850 - 3,900 cells/uL Final    Mono # 02/16/2023 364  200 - 950 cells/uL Final     Eos # 02/16/2023 123  15 - 500 cells/uL Final    Baso # 02/16/2023 49  0 - 200 cells/uL Final    Neutrophils Relative 02/16/2023 39  % Final    Lymph % 02/16/2023 45.7  % Final    Mono % 02/16/2023 10.4  % Final    Eosinophil % 02/16/2023 3.5  % Final    Basophil % 02/16/2023 1.4  % Final    TSH 02/16/2023 2.34  0.40 - 4.50 mIU/L Final    Cholesterol 02/16/2023 186  <200 mg/dL Final    HDL 02/16/2023 36 (L)  > OR = 40 mg/dL Final    Triglycerides 02/16/2023 348 (H)  <150 mg/dL Final    LDL Cholesterol 02/16/2023 104 (H)  mg/dL (calc) Final    HDL/Cholesterol Ratio 02/16/2023 5.2 (H)  <5.0 (calc) Final    Non HDL Chol. (LDL+VLDL) 02/16/2023 150 (H)  <130 mg/dL (calc) Final    PROSTATE SPECIFIC ANTIGEN, SCR - Q* 02/16/2023 0.96  < OR = 4.00 ng/mL Final    Hemoglobin A1C 02/16/2023 5.6  <5.7 % of total Hgb Final    Vitamin D, 25-OH, Total 02/16/2023 16 (L)  30 - 100 ng/mL Final         Assessment:      1. Mixed hyperlipidemia    2. Vitamin D deficiency Stable   3. Class 2 severe obesity due to excess calories with serious comorbidity and body mass index (BMI) of 37.0 to 37.9 in adult          Plan:     Mixed hyperlipidemia  Comments:  Eat a low cholesterol diet and exercise regularly    Vitamin D deficiency  Comments:  continue OTC Vitamin D3 daily    Class 2 severe obesity due to excess calories with serious comorbidity and body mass index (BMI) of 37.0 to 37.9 in adult  Comments:  Eat a low cholesterol diet and exercise regularly         No current outpatient medications on file.     No current facility-administered medications for this visit.       Medications Discontinued During This Encounter   Medication Reason    traMADoL (ULTRAM) 50 mg tablet Patient no longer taking    cholecalciferol, vitamin D3, (DECARA) 1,250 mcg (50,000 unit) capsule Therapy completed       Health Maintenance   Topic Date Due    Hepatitis C Screening  Never done    TETANUS VACCINE  Never done    Lipid Panel  02/16/2028       Patient  "Instructions   RTC in 2 months for F/U or sooner if needed.    Patient Education       Low Cholesterol, Saturated Fat, and Trans Fat Diet   About this topic   Cholesterol, saturated fat, and trans fat are in many foods. These may raise your blood cholesterol levels. If your cholesterol is too high, this can cause health problems in your heart, liver, kidneys, and even your eyes. The key to lowering your risk of heart problems is to lower your bad fat intake.  Saturated fats and trans fats are the bad fats. These fats clog your arteries and raise your bad cholesterol. Saturated fats and trans fats are solid fats at room temperature. Saturated fats are animal fats. Trans fats are manmade fats. They add flavor to a lot of packaged foods. Staying away from saturated and trans fats will help your heart.  When you do eat foods with fat, make sure they have the good fats. Monounsaturated and polyunsaturated fats are good fats. These fats help raise your good cholesterol and protect your heart.  General   How to Lower Fat and Cholesterol in Your Diet   Read the labels of the foods you buy from the market to find out how much fat is present. Under 5% of total fat on a label means it is "low fat". Over 20% of total fat on a label means it is high fat.  Eat high fiber foods, like soluble fiber. This type of fiber helps lower cholesterol in the body. Choose oatmeal, fruits (like apples), beans, and nuts to get the most soluble fiber.  Eat foods high in omega-3 fatty acids like henrietta seeds, walnuts, salmon, tuna, trout, herring, flaxseed, and soybeans. These foods help keep the heart healthy.  Limit your bad fat and oil intake.  Stay away from butter, stick margarine, shortening, lard, and palm and coconut oil. Pick plant-based spreads instead.  Limit mayonnaise, salad dressings, gravies, and sauces, unless it is made from low-fat ingredients.  Limit chocolate.  Do not eat high-fat processed foods like hot dogs, perez, sausage, " ham and other luncheon meats high in fat, and some frozen foods. Pick fish, chicken, turkey, and lean meats instead.  Eat more dried beans, lentils, and tofu to get your protein.  Do not eat organ meats, like liver.  Choose nonfat or low-fat milk, yogurt, and cheese.  Use light or fat-free cream cheese and sour cream.  Eat lots of fruits and vegetables.  Pick whole grain breads, cereals, pastas, and rice.  Do not eat snacks that are high in fats like granola, cookies, pies, pastries, doughnuts, and croissants.  Stay away from deep fried foods.  Help When Cooking   Remove the fat portion of meats and the skin from poultry before cooking.  Bake, broil, grill, poach, or roast poultry, fish, and lean meats.  Drain and throw away the fat that drains out of meat as you cook it.  Try to add little or no fat to foods.  Use olive or canola oil for cooking or baking.  Steam your vegetables.  Use herbs or no-oil marinades to flavor foods.         Who should use this diet?   This diet is for people who are at high risk of getting health problems like heart disease, high blood pressure, diabetes, and others. This diet is also good for all people to follow to keep your heart healthy.  What foods are good to eat?   Foods with good fats are:  Canola, peanut, and olive oil  Safflower, soybean, and corn oil  Walnuts, almonds, cashews, and peanuts  Pumpkin and sunflower seeds  [place] and tuna  Tofu  Soymilk  Avocado  What foods should be limited or avoided?   Stay away from these types of foods that have saturated fats:  Whole fat dairy products like cheese, ice cream, whole milk, and cream  Palm and coconut oils  High-fat meats like beef, lamb, poultry with the skin, perez, and sausage  Butter and lard  Stay away from these types of foods that may have trans fat:  Cookies, cakes, candy, doughnuts, baked goods, muffins, pizza dough, and pie crusts that are packaged  Fried foods  Frozen dinners  Chips and crackers  Microwave  popcorn  Stick margarine and vegetable shortenings  Helpful tips   To help stay away from saturated fat:  Pick lean cuts of meat  Take the skin off chicken and turkey or pick skinless  Pick low-fat cheese, milk, and ice cream  Use liquid oils when cooking and baking, such as olive oil and canola oil  To help stay away from trans fat:  Look at your labels. Choose foods with 0% trans fat. Read the ingredient list. Avoid foods with partially hydrogenated oil in the ingredient list. This means there is trans fat in the product.  Where can I learn more?   American Heart Association   http://www.heart.org/HEARTORG/Conditions/Cholesterol/PreventionTreatmentofHighCholesterol/Know-Your-Fats_[facility]_305628_Article.jsp   Last Reviewed Date   2021-10-05  Consumer Information Use and Disclaimer   This information is not specific medical advice and does not replace information you receive from your health care provider. This is only a brief summary of general information. It does NOT include all information about conditions, illnesses, injuries, tests, procedures, treatments, therapies, discharge instructions or life-style choices that may apply to you. You must talk with your health care provider for complete information about your health and treatment options. This information should not be used to decide whether or not to accept your health care providers advice, instructions or recommendations. Only your health care provider has the knowledge and training to provide advice that is right for you.   Copyright   Copyright © 2021 [employer] and its affiliates and/or licensors. All rights reserved.          Risks, benefits, and alternatives discussed with patient, Patient verbalized understanding of discussed plan of care. Asked patient if any further questions, answered no.    Future Appointments   Date Time Provider Department Center   10/5/2023  2:20 PM Maryellen Samuel NP [id number]  Shahid Samuel,  NP

## 2024-02-06 DIAGNOSIS — Z12.11 COLON CANCER SCREENING: ICD-10-CM
